# Patient Record
Sex: MALE | Race: WHITE
[De-identification: names, ages, dates, MRNs, and addresses within clinical notes are randomized per-mention and may not be internally consistent; named-entity substitution may affect disease eponyms.]

---

## 2018-03-16 ENCOUNTER — HOSPITAL ENCOUNTER (EMERGENCY)
Age: 58
Discharge: HOME | End: 2018-03-16
Payer: COMMERCIAL

## 2018-03-16 VITALS
SYSTOLIC BLOOD PRESSURE: 173 MMHG | TEMPERATURE: 98.4 F | DIASTOLIC BLOOD PRESSURE: 100 MMHG | RESPIRATION RATE: 16 BRPM | OXYGEN SATURATION: 97 % | HEART RATE: 71 BPM

## 2018-03-16 VITALS
SYSTOLIC BLOOD PRESSURE: 149 MMHG | TEMPERATURE: 98.1 F | RESPIRATION RATE: 20 BRPM | HEART RATE: 73 BPM | OXYGEN SATURATION: 95 % | DIASTOLIC BLOOD PRESSURE: 104 MMHG

## 2018-03-16 VITALS — BODY MASS INDEX: 32.1 KG/M2
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
## 2018-03-16 DIAGNOSIS — Z23: ICD-10-CM

## 2018-03-16 DIAGNOSIS — S62.663A: ICD-10-CM

## 2018-03-16 DIAGNOSIS — S61.213A: Primary | ICD-10-CM

## 2018-03-16 PROCEDURE — 90471 IMMUNIZATION ADMIN: CPT

## 2018-03-16 PROCEDURE — 99282 EMERGENCY DEPT VISIT SF MDM: CPT

## 2018-03-16 PROCEDURE — 90715 TDAP VACCINE 7 YRS/> IM: CPT

## 2018-03-16 PROCEDURE — 73130 X-RAY EXAM OF HAND: CPT

## 2018-03-26 ENCOUNTER — OFFICE VISIT (OUTPATIENT)
Dept: PHYSICAL THERAPY | Facility: CLINIC | Age: 58
End: 2018-03-26

## 2018-03-26 ENCOUNTER — TRANSCRIBE ORDERS (OUTPATIENT)
Dept: PHYSICAL THERAPY | Facility: CLINIC | Age: 58
End: 2018-03-26

## 2018-03-26 DIAGNOSIS — S62.615A CLOSED DISPLACED FRACTURE OF PROXIMAL PHALANX OF LEFT RING FINGER, INITIAL ENCOUNTER: ICD-10-CM

## 2018-03-26 DIAGNOSIS — S67.22XA CRUSHING INJURY OF LEFT HAND AND FINGER, INITIAL ENCOUNTER: ICD-10-CM

## 2018-03-26 DIAGNOSIS — S62.613A CLOSED DISPLACED FRACTURE OF PROXIMAL PHALANX OF LEFT MIDDLE FINGER, INITIAL ENCOUNTER: Primary | ICD-10-CM

## 2018-03-26 DIAGNOSIS — S62.639A CLOSED FRACTURE OF TUFT OF DISTAL PHALANX OF FINGER: Primary | ICD-10-CM

## 2018-03-26 PROCEDURE — L3933 FO W/O JOINTS CF: HCPCS | Performed by: PHYSICAL THERAPIST

## 2018-03-28 NOTE — PROGRESS NOTES
Artemio Almontearns 1960   Diagnosis/ Surgery: Left hand crush injuries with Ring/Long finger Smooth Fractures.              Date Of Injury: 3/16/2018    Date Of Surgery:N/A    Hand Dominance: Left   History of Present Condition: Work related injury.  Left hand was crushed in a dock plate, causing Tuft Fractures of the ring and long fingers.  Medical/Vocational History/ Medications: Employment: [employer] as a .  See MD notes for PMH.    Pain: 1/10 at rest, 7/10 with fingers are moved    Edema: Severe + at ring and long fingers  Sensibility: WNL   Wound Status:Wounds healing well.  ROM/ Strength/Test: DIP not assessed due to fracture precautions, rest of hand is WFL    Splinting:  · Patient was measure and fit with a custom fabricated bi-valve type DIP immobilization splints for left Long & Ring Fingers   · Patient was instructed in wearing schedule, precautions and care of the splint during this visit.   · Patient was instructed in proper donning/doffing of splint.   Assessment:  · Patient was fitted and appropriate splint was fabricated this date.  · Patient reported that splint was comfortable and had no complications with the fit of the splint.  · Patient was instructed and patient verbalized understanding of precautions, wear and care of the splint.   · Patient demonstrated independent donning/doffing of splint during treatment today.  Goals:  · Patient was fitted properly with appropriate splint for diagnosis  · Patient was educated on precautions, wear schedule and care of splint  · Patient demonstrated independence with donning/doffing of the splint.  · Splint was provided to Protect Healing Structures, Restrict Mobility, Improve joint alignment.  Plan:  · No additional treatment is required for this patient at this time. The patient is therefore discharged from therapy.  · Patient advised to contact therapist with any additional questions or concerns regarding the fit and function of the  splint.  · Patient will be seen for splint issues as needed   · Wear Instructions: Off for hygiene       PT SIGNATURE: Naman Perez PT, CHT   DATE TREATMENT INITIATED: 3/26/2018    Physician Signature____________________________________ Date____________